# Patient Record
Sex: FEMALE | Race: BLACK OR AFRICAN AMERICAN | NOT HISPANIC OR LATINO | Employment: STUDENT | URBAN - METROPOLITAN AREA
[De-identification: names, ages, dates, MRNs, and addresses within clinical notes are randomized per-mention and may not be internally consistent; named-entity substitution may affect disease eponyms.]

---

## 2024-11-19 ENCOUNTER — OFFICE VISIT (OUTPATIENT)
Dept: URGENT CARE | Facility: CLINIC | Age: 19
End: 2024-11-19
Payer: OTHER GOVERNMENT

## 2024-11-19 VITALS
HEIGHT: 68 IN | TEMPERATURE: 99 F | SYSTOLIC BLOOD PRESSURE: 108 MMHG | OXYGEN SATURATION: 98 % | BODY MASS INDEX: 28.79 KG/M2 | RESPIRATION RATE: 16 BRPM | HEART RATE: 80 BPM | WEIGHT: 190 LBS | DIASTOLIC BLOOD PRESSURE: 71 MMHG

## 2024-11-19 DIAGNOSIS — Z23 IMMUNIZATION DUE: Primary | ICD-10-CM

## 2024-11-19 DIAGNOSIS — Z11.1 ENCOUNTER FOR PPD SKIN TEST READING: ICD-10-CM

## 2024-11-19 DIAGNOSIS — Z02.0 ENCOUNTER FOR SCHOOL HISTORY AND PHYSICAL EXAMINATION: ICD-10-CM

## 2024-11-19 PROCEDURE — 99499 UNLISTED E&M SERVICE: CPT | Mod: S$GLB,,, | Performed by: NURSE PRACTITIONER

## 2024-11-19 NOTE — PROGRESS NOTES
"Subjective:      Patient ID: Charley Dooley is a 19 y.o. female.    Vitals:  height is 5' 8" (1.727 m) and weight is 86.2 kg (190 lb). Her oral temperature is 99.2 °F (37.3 °C). Her blood pressure is 108/71 and her pulse is 80. Her respiration is 16 and oxygen saturation is 98%.     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      Assessment:     1. Encounter for school history and physical examination    2. Encounter for PPD skin test reading        Plan:       Encounter for school history and physical examination    Encounter for PPD skin test reading  -     POCT TB Skin Test Read                Patient Instructions   Return in 48-72 hours for Tb skin test reading.            "

## 2024-11-21 LAB
TB INDURATION - 48 HR READ: 0 MM
TB SKIN TEST - 48 HR READ: NEGATIVE